# Patient Record
Sex: MALE | Race: WHITE | NOT HISPANIC OR LATINO | Employment: FULL TIME | URBAN - METROPOLITAN AREA
[De-identification: names, ages, dates, MRNs, and addresses within clinical notes are randomized per-mention and may not be internally consistent; named-entity substitution may affect disease eponyms.]

---

## 2018-01-12 PROBLEM — Z79.899 ENCOUNTER FOR LONG-TERM (CURRENT) USE OF MEDICATIONS: Status: ACTIVE | Noted: 2018-01-12

## 2018-01-12 PROBLEM — I10 MODERATE HYPERTENSION: Status: ACTIVE | Noted: 2018-01-12

## 2018-12-11 PROBLEM — Z12.5 SCREENING FOR PROSTATE CANCER: Status: ACTIVE | Noted: 2018-12-11

## 2019-01-14 PROBLEM — Z00.00 WELL ADULT EXAM: Status: ACTIVE | Noted: 2018-12-11

## 2019-04-15 PROBLEM — Z00.00 WELL ADULT EXAM: Status: RESOLVED | Noted: 2018-12-11 | Resolved: 2019-04-15

## 2020-05-04 PROBLEM — Z00.00 WELL ADULT EXAM: Status: ACTIVE | Noted: 2020-05-04

## 2020-05-04 PROBLEM — Z12.5 SCREENING PSA (PROSTATE SPECIFIC ANTIGEN): Status: ACTIVE | Noted: 2020-05-04

## 2020-08-03 PROBLEM — Z00.00 WELL ADULT EXAM: Status: RESOLVED | Noted: 2020-05-04 | Resolved: 2020-08-03

## 2021-07-28 PROBLEM — Z00.00 ANNUAL PHYSICAL EXAM: Status: ACTIVE | Noted: 2021-07-28

## 2021-11-01 PROBLEM — Z00.00 ANNUAL PHYSICAL EXAM: Status: RESOLVED | Noted: 2021-07-28 | Resolved: 2021-11-01

## 2022-10-17 PROBLEM — Z00.00 ANNUAL PHYSICAL EXAM: Status: RESOLVED | Noted: 2021-07-28 | Resolved: 2022-10-17

## 2023-08-23 PROBLEM — E78.6 LOW HDL (UNDER 40): Status: ACTIVE | Noted: 2023-08-23

## 2023-11-27 PROBLEM — Z00.00 ANNUAL PHYSICAL EXAM: Status: RESOLVED | Noted: 2021-07-28 | Resolved: 2023-11-27

## 2024-01-17 ENCOUNTER — TELEPHONE (OUTPATIENT)
Dept: RHEUMATOLOGY | Facility: CLINIC | Age: 69
End: 2024-01-17

## 2024-01-17 NOTE — TELEPHONE ENCOUNTER
----- Message from Malachi Lainez sent at 1/12/2024 11:05 AM CST -----  Regarding: pt call back  Name of Who is Calling:Pt         What is the request in detail: Pt inquiring new patient appt in regards to shoulder pain  Please advise and contact             Can the clinic reply by [facility]: no         What Number to Call Back [facility] not [facility]:Telephone Information:  [employer]          [phone]